# Patient Record
Sex: FEMALE | Race: WHITE
[De-identification: names, ages, dates, MRNs, and addresses within clinical notes are randomized per-mention and may not be internally consistent; named-entity substitution may affect disease eponyms.]

---

## 2019-03-17 ENCOUNTER — HOSPITAL ENCOUNTER (EMERGENCY)
Dept: HOSPITAL 10 - FTE | Age: 34
LOS: 1 days | Discharge: HOME | End: 2019-03-18
Payer: COMMERCIAL

## 2019-03-17 ENCOUNTER — HOSPITAL ENCOUNTER (EMERGENCY)
Dept: HOSPITAL 91 - FTE | Age: 34
LOS: 1 days | Discharge: HOME | End: 2019-03-18
Payer: COMMERCIAL

## 2019-03-17 VITALS — HEART RATE: 95 BPM | DIASTOLIC BLOOD PRESSURE: 83 MMHG | SYSTOLIC BLOOD PRESSURE: 140 MMHG | RESPIRATION RATE: 19 BRPM

## 2019-03-17 VITALS
WEIGHT: 145.31 LBS | HEIGHT: 62 IN | HEIGHT: 62 IN | BODY MASS INDEX: 26.74 KG/M2 | WEIGHT: 145.31 LBS | BODY MASS INDEX: 26.74 KG/M2

## 2019-03-17 DIAGNOSIS — M54.41: Primary | ICD-10-CM

## 2019-03-17 PROCEDURE — 81025 URINE PREGNANCY TEST: CPT

## 2019-03-17 PROCEDURE — 72100 X-RAY EXAM L-S SPINE 2/3 VWS: CPT

## 2019-03-17 PROCEDURE — 81003 URINALYSIS AUTO W/O SCOPE: CPT

## 2019-03-17 PROCEDURE — 99284 EMERGENCY DEPT VISIT MOD MDM: CPT

## 2019-03-17 PROCEDURE — 96372 THER/PROPH/DIAG INJ SC/IM: CPT

## 2019-03-18 LAB
URINE KETONES (DIP) POC: (no result)
URINE PH (DIP) POC: 6 (ref 5–8.5)

## 2019-03-18 RX ADMIN — KETOROLAC TROMETHAMINE 1 MG: 30 INJECTION, SOLUTION INTRAMUSCULAR at 01:07

## 2019-03-18 RX ADMIN — ONDANSETRON 1 MG: 4 TABLET, ORALLY DISINTEGRATING ORAL at 01:03

## 2019-03-18 NOTE — ERD
ER Documentation


Chief Complaint


Chief Complaint





BACK PAIN WITH NAUSEA; NO UTI; NO KNOWN INJ X1DAY





HPI


33-year-old female presents with low back pain starting tonight.  She denies any


history of injury, or inciting events.  The pain radiates down her right 


buttock.  She denies any bowel bladder incontinence, weakness.  She denies any 


history of trauma, fevers, additional symptoms.





ROS


All systems reviewed and are negative except as per history of present illness.





Medications


Home Meds


Active Scripts


Ibuprofen* (Ibuprofen*) 600 Mg Tablet, 600 MG PO Q6, #20 TAB


   Prov:PARMINDER HINOJOSA MD         3/18/19


Hydrocodone/Acetaminophen (Norco 5-325 Tablet) 1 Each Tablet, 1 TAB PO Q6H PRN 


for PAIN, #12 TAB


   Prov:PARMINDER HINOJOSA MD         3/18/19





Allergies


Allergies:  


Coded Allergies:  


     No Known Allergy (Unverified , 3/17/19)





PMhx/Soc


Medical and Surgical Hx:  pt denies Medical Hx, pt denies Surgical Hx


Hx Alcohol Use:  No


Hx Substance Use:  No


Hx Tobacco Use:  No


Smoking Status:  Never smoker





FmHx


Family History:  No diabetes, No coronary disease, No other





Physical Exam


Vitals





Vital Signs


  Date      Temp  Pulse  Resp  B/P (MAP)   Pulse Ox  O2          O2 Flow    FiO2


Time                                                 Delivery    Rate


   3/17/19  99.9     95    19      140/83       100


     21:24                          (102)





Physical Exam


Const:   No acute distress


Head:   Atraumatic 


Eyes:    Normal Conjunctiva


ENT:    Normal External Ears, Nose and Mouth.


Neck:               Full range of motion. No meningismus.


Resp:   Clear to auscultation bilaterally


Cardio:   Regular rate and rhythm, no murmurs


Abd:    Soft, non tender, non distended. Normal bowel sounds


Skin:   No petechiae or rashes


Back:   No midline or flank tenderness.  Tenderness L5-S1 area without 


deformities, midline tenderness.  Positive straight leg raise on the right.  


Difficulty ambulating due to pain but no deficits or weakness.


Ext:    No cyanosis, or edema


Neur:   Awake and alert


Psych:    Normal Mood and Affect


Results 24 hrs





Laboratory Tests


       Test
                                3/18/19
01:11  3/18/19
01:16


       Bedside Urine pH (LAB)                        6.0


       Bedside Urine Protein (LAB)          Negative


       Bedside Urine Glucose (UA)           Negative


       Bedside Urine Ketones (LAB)                    1+


       Bedside Urine Blood                  Negative


       Bedside Urine Nitrite (LAB)          Negative


       Bedside Urine Leukocyte
Esterase (L  Negative 
     



       POC Beta HCG, Qualitative                           NEGATIVE





Current Medications


 Medications
   Dose
          Sig/Nay
       Start Time
   Status  Last


 (Trade)       Ordered        Route
 PRN     Stop Time              Admin
Dose


                              Reason                                Admin


 Ketorolac
     30 mg          ONCE  STAT
    3/18/19       DC           3/18/19


Tromethamine
                 IM
            00:37
                       01:07



 (Toradol)                                   3/18/19 00:38


 Ondansetron    8 mg           ONCE  STAT
    3/18/19       DC           3/18/19


HCl
  (Zofran                 ODT
           00:37
                       01:03



Odt)                                         3/18/19 00:38


 Morphine       4 mg           ONCE  STAT
    3/18/19       DC           3/18/19


Sulfate
                      IM
            00:37
                       01:03



(morphine)                                   3/18/19 00:38








Procedures/MDM


X-ray LS-Spine 3V Interpreted by me: 


Bones:    No fracture, or lytic lesions


Joints:       No dislocation


Foreign body:    None.  Impression-lumbar spine x-ray





Urine is negative.  HCG is negative.  Patient was given Toradol 30 mg IM 


morphine 4 mg IM for acute pain.  Patient presents with nontraumatic low back 


pain with without signs of epidural abscess, cauda equina syndrome, deficits, 


bacterial infection, additional concerning symptoms.  She may have new onset 


sciatica.  She will treated with Norco, ibuprofen, instructions for exercises, 


primary care follow-up and return precautions for fevers, weakness, deficits, 


new worsening symptoms with primary care doctor this week.  The patient was 


stable with no new complaints during the ER course. Clinically, there is no 


current evidence to suggest meningitis, sepsis, acute abdomen, pneumonia, 


stroke,  acute coronary syndrome, pulmonary embolism, aortic dissection or any 


other emergent condition appearing to require further evaluation or 


hospitalization.  Patient counseled regarding my diagnostic impression and care 


plan. Prior to discharge all questions answered. Pt agrees with treatment plan 


and understands strict return precautions. Pt is instructed to follow up with 


primary care provider within 24-48 hours. Precautionary instructions provided 


including instructions to return to the ER if not improving or for any worsening


or changing symptoms or concerns.





Departure


Diagnosis:  


   Primary Impression:  


   Back pain


   Back pain location:  low back pain  Chronicity:  acute  Back pain laterality:


    right  Sciatica presence:  with sciatica  Sciatica laterality:  sciatica of 


   right side  Qualified Codes:  M54.41 - Lumbago with sciatica, right side


Condition:  Stable


Patient Instructions:  Back Exercises, Lumbar, Back Pain W/ Sciatica


Referrals:  


NO PRIMARY,CARE PHYSICIAN (PCP)





Additional Instructions:  


X-ray normal. probablamnete disco o musculos . Cheque otro vez con haji doctor 


primario en el proximo narvaez or regresa para mas o nueva simptomas- fiebre.











PARMINDER HINOJOSA MD             Mar 18, 2019 03:05